# Patient Record
(demographics unavailable — no encounter records)

---

## 2025-07-24 NOTE — PLAN
[FreeTextEntry1] : The patient is trying to conceive for the last seven months without success. In the past she had two normal spontaneous pregnancy and one chemical pregnancy. No surgeries in the past and last TSH levels are stable with normal regular periods.  We discussed management options: 1. Conservative with minimal interventions as there nothing to suggest any fertility issues but the maternal age. 2. Aggressive - with blood tests and to consider follicle count, ovulation induction with possible IUI, etc. I reassure the patient that very likely nothing is really wrong, and the patient opted to wait at least another 6 moths.

## 2025-07-24 NOTE — HISTORY OF PRESENT ILLNESS
[Patient reported PAP Smear was normal] : Patient reported PAP Smear was normal [N] : Patient does not use contraception [Y] : Positive pregnancy history [Normal Amount/Duration] :  normal amount and duration [Regular Cycle Intervals] : periods have been regular [Frequency: Q ___ days] : menstrual periods occur approximately every [unfilled] days [Menarche Age: ____] : age at menarche was [unfilled] [Menstrual Cramps] : menstrual cramps [Currently Active] : currently active [Men] : men [No] : No [PapSmeardate] : 2024 [TextBox_31] : as per patient [LMPDate] : 7/21/2025 [MensesFreq] : 27-30 [MensesLength] : 5-6 [PGHxTotal] : 3 [[facility] : 2 [FreeTextEntry1] : 7/21/2025